# Patient Record
Sex: MALE | Race: WHITE | NOT HISPANIC OR LATINO | Employment: STUDENT | URBAN - METROPOLITAN AREA
[De-identification: names, ages, dates, MRNs, and addresses within clinical notes are randomized per-mention and may not be internally consistent; named-entity substitution may affect disease eponyms.]

---
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
## 2021-01-04 ENCOUNTER — HOSPITAL ENCOUNTER (OUTPATIENT)
Dept: OTHER | Facility: HOSPITAL | Age: 21
Discharge: HOME | End: 2021-01-04
Attending: PEDIATRICS

## 2021-01-05 LAB — SARS-COV-2 RNA SPEC QL NAA+PROBE: NOT DETECTED

## 2021-01-12 ENCOUNTER — HOSPITAL ENCOUNTER (OUTPATIENT)
Dept: OTHER | Facility: HOSPITAL | Age: 21
Discharge: HOME | End: 2021-01-12
Attending: INTERNAL MEDICINE

## 2021-01-12 LAB — SARS-COV-2 RNA SPEC QL NAA+PROBE: NOT DETECTED

## 2021-05-15 VITALS
WEIGHT: 239 LBS | TEMPERATURE: 97.4 F | HEIGHT: 70 IN | HEART RATE: 62 BPM | SYSTOLIC BLOOD PRESSURE: 118 MMHG | DIASTOLIC BLOOD PRESSURE: 68 MMHG | BODY MASS INDEX: 34.22 KG/M2

## 2021-05-15 VITALS
HEIGHT: 70 IN | DIASTOLIC BLOOD PRESSURE: 53 MMHG | SYSTOLIC BLOOD PRESSURE: 113 MMHG | WEIGHT: 241.12 LBS | HEART RATE: 68 BPM | OXYGEN SATURATION: 99 % | BODY MASS INDEX: 34.52 KG/M2

## 2021-05-15 VITALS — HEIGHT: 70 IN | RESPIRATION RATE: 12 BRPM | BODY MASS INDEX: 34.07 KG/M2 | WEIGHT: 238 LBS

## 2021-12-22 ENCOUNTER — IMMUNIZATION (OUTPATIENT)
Dept: VACCINE CLINIC | Facility: HOSPITAL | Age: 21
End: 2021-12-22

## 2021-12-22 PROCEDURE — 0003A: CPT | Performed by: INTERNAL MEDICINE

## 2021-12-22 PROCEDURE — 91300 HC SARSCOV02 VAC 30MCG/0.3ML IM: CPT | Performed by: INTERNAL MEDICINE

## 2021-12-22 PROCEDURE — 0004A HC ADM SARSCOV2 30MCG/0.3ML BOOSTER: CPT | Performed by: INTERNAL MEDICINE

## 2021-12-30 ENCOUNTER — LAB (OUTPATIENT)
Dept: LAB | Facility: HOSPITAL | Age: 21
End: 2021-12-30

## 2021-12-30 DIAGNOSIS — Z20.822 EXPOSURE TO COVID-19 VIRUS: Primary | ICD-10-CM

## 2021-12-30 DIAGNOSIS — Z20.822 EXPOSURE TO COVID-19 VIRUS: ICD-10-CM

## 2021-12-30 LAB — SARS-COV-2 RNA RESP QL NAA+PROBE: NOT DETECTED

## 2021-12-30 PROCEDURE — U0004 COV-19 TEST NON-CDC HGH THRU: HCPCS

## 2022-05-16 ENCOUNTER — PATIENT ROUNDING (BHMG ONLY) (OUTPATIENT)
Dept: INTERNAL MEDICINE | Facility: CLINIC | Age: 22
End: 2022-05-16

## 2022-05-16 ENCOUNTER — OFFICE VISIT (OUTPATIENT)
Dept: INTERNAL MEDICINE | Facility: CLINIC | Age: 22
End: 2022-05-16

## 2022-05-16 VITALS
TEMPERATURE: 97.7 F | HEART RATE: 63 BPM | DIASTOLIC BLOOD PRESSURE: 78 MMHG | WEIGHT: 249.4 LBS | BODY MASS INDEX: 35.71 KG/M2 | HEIGHT: 70 IN | SYSTOLIC BLOOD PRESSURE: 117 MMHG | OXYGEN SATURATION: 97 %

## 2022-05-16 DIAGNOSIS — G47.00 INSOMNIA, UNSPECIFIED TYPE: ICD-10-CM

## 2022-05-16 DIAGNOSIS — Z11.59 NEED FOR HEPATITIS C SCREENING TEST: ICD-10-CM

## 2022-05-16 DIAGNOSIS — Z00.00 ANNUAL PHYSICAL EXAM: Primary | ICD-10-CM

## 2022-05-16 DIAGNOSIS — J30.1 SEASONAL ALLERGIC RHINITIS DUE TO POLLEN: ICD-10-CM

## 2022-05-16 DIAGNOSIS — Z13.220 SCREENING FOR LIPID DISORDERS: ICD-10-CM

## 2022-05-16 DIAGNOSIS — M25.571 ACUTE RIGHT ANKLE PAIN: ICD-10-CM

## 2022-05-16 PROBLEM — J30.2 SEASONAL ALLERGIC RHINITIS: Status: ACTIVE | Noted: 2022-05-16

## 2022-05-16 LAB
ALBUMIN SERPL-MCNC: 4.7 G/DL (ref 3.5–5.2)
ALBUMIN/GLOB SERPL: 2.2 G/DL
ALP SERPL-CCNC: 57 U/L (ref 39–117)
ALT SERPL W P-5'-P-CCNC: 21 U/L (ref 1–41)
ANION GAP SERPL CALCULATED.3IONS-SCNC: 12.8 MMOL/L (ref 5–15)
AST SERPL-CCNC: 14 U/L (ref 1–40)
BASOPHILS # BLD AUTO: 0.05 10*3/MM3 (ref 0–0.2)
BASOPHILS NFR BLD AUTO: 0.7 % (ref 0–1.5)
BILIRUB SERPL-MCNC: 0.2 MG/DL (ref 0–1.2)
BUN SERPL-MCNC: 12 MG/DL (ref 6–20)
BUN/CREAT SERPL: 12.4 (ref 7–25)
CALCIUM SPEC-SCNC: 9.6 MG/DL (ref 8.6–10.5)
CHLORIDE SERPL-SCNC: 101 MMOL/L (ref 98–107)
CHOLEST SERPL-MCNC: 155 MG/DL (ref 0–200)
CO2 SERPL-SCNC: 23.2 MMOL/L (ref 22–29)
CREAT SERPL-MCNC: 0.97 MG/DL (ref 0.76–1.27)
DEPRECATED RDW RBC AUTO: 42 FL (ref 37–54)
EGFRCR SERPLBLD CKD-EPI 2021: 113.2 ML/MIN/1.73
EOSINOPHIL # BLD AUTO: 0.29 10*3/MM3 (ref 0–0.4)
EOSINOPHIL NFR BLD AUTO: 4.2 % (ref 0.3–6.2)
ERYTHROCYTE [DISTWIDTH] IN BLOOD BY AUTOMATED COUNT: 12.3 % (ref 12.3–15.4)
GLOBULIN UR ELPH-MCNC: 2.1 GM/DL
GLUCOSE SERPL-MCNC: 72 MG/DL (ref 65–99)
HCT VFR BLD AUTO: 45.4 % (ref 37.5–51)
HCV AB SER DONR QL: NORMAL
HDLC SERPL-MCNC: 38 MG/DL (ref 40–60)
HGB BLD-MCNC: 15 G/DL (ref 13–17.7)
LDLC SERPL CALC-MCNC: 95 MG/DL (ref 0–100)
LDLC/HDLC SERPL: 2.44 {RATIO}
LYMPHOCYTES # BLD AUTO: 1.81 10*3/MM3 (ref 0.7–3.1)
LYMPHOCYTES NFR BLD AUTO: 26.4 % (ref 19.6–45.3)
MCH RBC QN AUTO: 30.3 PG (ref 26.6–33)
MCHC RBC AUTO-ENTMCNC: 33 G/DL (ref 31.5–35.7)
MCV RBC AUTO: 91.7 FL (ref 79–97)
MONOCYTES # BLD AUTO: 0.5 10*3/MM3 (ref 0.1–0.9)
MONOCYTES NFR BLD AUTO: 7.3 % (ref 5–12)
NEUTROPHILS NFR BLD AUTO: 4.2 10*3/MM3 (ref 1.7–7)
NEUTROPHILS NFR BLD AUTO: 61.3 % (ref 42.7–76)
PLAT MORPH BLD: NORMAL
PLATELET # BLD AUTO: 291 10*3/MM3 (ref 140–450)
PMV BLD AUTO: 10.3 FL (ref 6–12)
POTASSIUM SERPL-SCNC: 4.4 MMOL/L (ref 3.5–5.2)
PROT SERPL-MCNC: 6.8 G/DL (ref 6–8.5)
RBC # BLD AUTO: 4.95 10*6/MM3 (ref 4.14–5.8)
RBC MORPH BLD: NORMAL
SODIUM SERPL-SCNC: 137 MMOL/L (ref 136–145)
TRIGL SERPL-MCNC: 121 MG/DL (ref 0–150)
VLDLC SERPL-MCNC: 22 MG/DL (ref 5–40)
WBC MORPH BLD: NORMAL
WBC NRBC COR # BLD: 6.86 10*3/MM3 (ref 3.4–10.8)

## 2022-05-16 PROCEDURE — 99395 PREV VISIT EST AGE 18-39: CPT | Performed by: INTERNAL MEDICINE

## 2022-05-16 PROCEDURE — 85025 COMPLETE CBC W/AUTO DIFF WBC: CPT | Performed by: INTERNAL MEDICINE

## 2022-05-16 PROCEDURE — 80053 COMPREHEN METABOLIC PANEL: CPT | Performed by: INTERNAL MEDICINE

## 2022-05-16 PROCEDURE — 99214 OFFICE O/P EST MOD 30 MIN: CPT | Performed by: INTERNAL MEDICINE

## 2022-05-16 PROCEDURE — 85007 BL SMEAR W/DIFF WBC COUNT: CPT | Performed by: INTERNAL MEDICINE

## 2022-05-16 PROCEDURE — 86803 HEPATITIS C AB TEST: CPT | Performed by: INTERNAL MEDICINE

## 2022-05-16 PROCEDURE — 80061 LIPID PANEL: CPT | Performed by: INTERNAL MEDICINE

## 2022-05-16 RX ORDER — DESLORATADINE 5 MG/1
5 TABLET ORAL DAILY
Qty: 90 TABLET | Refills: 3 | Status: SHIPPED | OUTPATIENT
Start: 2022-05-16

## 2022-05-16 RX ORDER — MONTELUKAST SODIUM 10 MG/1
10 TABLET ORAL NIGHTLY
Qty: 90 TABLET | Refills: 3 | Status: SHIPPED | OUTPATIENT
Start: 2022-05-16

## 2022-05-16 RX ORDER — DESLORATADINE 5 MG/1
5 TABLET ORAL DAILY
COMMUNITY
End: 2022-05-16 | Stop reason: SDUPTHER

## 2022-05-16 RX ORDER — ALBUTEROL SULFATE 90 UG/1
2 AEROSOL, METERED RESPIRATORY (INHALATION) EVERY 4 HOURS PRN
Qty: 18 G | Refills: 3 | Status: SHIPPED | OUTPATIENT
Start: 2022-05-16

## 2022-05-16 NOTE — PROGRESS NOTES
"Chief Complaint  Establish Care, Ankle Pain (Right- about a week. Landing on it wrong), and Insomnia (Not much sleeping- years)    Subjective          Humza Murdock presents to [facility]  History of Present Illness  Previous PCP: Dr mullins only saw him once  But patient thinks that was the last one   Specialist(s): n/a  COVID vaccine: UTD   Pneumonia vaccine: N/a  Shingles vaccine: N/a  Colon cancer screening: N/a     Right ankle pain - patient reports landing on ankle with inversion approx 1 week ago. Patient reports initially difficulty walking. Patient denies swelling and erythema. Patient reports improving pain throughout the last week.   Insomnia- patient reports difficulty falling asleep for several years. Patient reports restless sleep as well. Patient reports trial of melatonin previously without effect.     Current Outpatient Medications   Medication Instructions   • albuterol sulfate HFA (ProAir HFA) 108 (90 Base) MCG/ACT inhaler 2 puffs, Inhalation, Every 4 Hours PRN   • desloratadine (CLARINEX) 5 mg, Oral, Daily   • Fluticasone Furoate 50 mcg, Nasal, 2 Times Daily   • montelukast (SINGULAIR) 10 mg, Oral, Nightly       The following portions of the patient's history were reviewed and updated as appropriate: allergies, current medications, past family history, past medical history, past social history, past surgical history, and problem list.    Objective   Vital Signs:   /78 (BP Location: Left arm)   Pulse 63   Temp 97.7 °F (36.5 °C) (Temporal)   Ht 177.8 cm (70\")   Wt 113 kg (249 lb 6.4 oz)   SpO2 97%   BMI 35.79 kg/m²     Wt Readings from Last 3 Encounters:   05/16/22 113 kg (249 lb 6.4 oz)   12/17/19 109 kg (241 lb 2 oz) (99 %, Z= 2.24)*   12/19/18 108 kg (238 lb) (99 %, Z= 2.24)*     * Growth percentiles are based on CDC (Boys, 2-20 Years) data.     BP Readings from Last 3 Encounters:   05/16/22 117/78   12/17/19 113/53   12/18/18 118/68 "     Physical Exam   Appearance: No acute distress, well-nourished  Head: normocephalic, atraumatic  Eyes: extraocular movements intact, no scleral icterus, no conjunctival injection  Ears, Nose, and Throat: external ears normal, nares patent, moist mucous membranes  Cardiovascular: regular rate and rhythm. no murmurs, rubs, or gallops. no edema  Respiratory: breathing comfortably, symmetric chest rise, clear to auscultation bilaterally. No wheezes, rales, or rhonchi.  Neuro: alert and oriented to time, place, and person. Normal gait  Psych: normal mood and affect   Right ankle: no TTP overlying malleloi, midfoot, base of 5th metatarsal or achilles tendon. +ambulatory.     Result Review :   The following data was reviewed by: Albino Archibald Jr, MD on 05/16/2022:         Assessment and Plan    Diagnoses and all orders for this visit:    1. Annual physical exam (Primary)  -     Comprehensive Metabolic Panel  -     CBC & Differential    2. Seasonal allergic rhinitis due to pollen  Comments:  add isngulair to regimen for additioanl control.   Orders:  -     montelukast (Singulair) 10 MG tablet; Take 1 tablet by mouth Every Night.  Dispense: 90 tablet; Refill: 3  -     desloratadine (CLARINEX) 5 MG tablet; Take 1 tablet by mouth Daily.  Dispense: 90 tablet; Refill: 3  -     Fluticasone Furoate 50 MCG/ACT aerosol powder ; 50 mcg into the nostril(s) as directed by provider 2 (Two) Times a Day.  Dispense: 60 each; Refill: 3  -     albuterol sulfate HFA (ProAir HFA) 108 (90 Base) MCG/ACT inhaler; Inhale 2 puffs Every 4 (Four) Hours As Needed for Wheezing or Shortness of Air.  Dispense: 18 g; Refill: 3    3. Insomnia, unspecified type  Comments:  discussed sleep hygiene as well as possible medicine such as melatonin, trazodone, and doxepin.     4. Acute right ankle pain  Comments:  neg [place] ankle rules. likely sprain. monitor.     5. Screening for lipid disorders  -     Lipid Panel    6. Need for hepatitis C  screening test  -     Hepatitis C Antibody      Advised on diet, physical activity, etc      Medications Discontinued During This Encounter   Medication Reason   • desloratadine (CLARINEX) 5 MG tablet Reorder   • Albuterol Sulfate (PROAIR HFA IN) Reorder   • Fluticasone Furoate 50 MCG/ACT aerosol powder  Reorder        Follow Up {Instructions Charge Capture  Follow-up Communications :23}  Return in about 1 year (around 5/16/2023) for Annual physical.  Patient was given instructions and counseling regarding his condition or for health maintenance advice. Please see specific information pulled into the AVS if appropriate.       Albino Archibald Jr, MD  05/16/22  11:18 EDT

## 2022-05-16 NOTE — PROGRESS NOTES
A SoundCloud message has been sent to the patient for PATIENT ROUNDING with [facility]

## 2022-05-20 ENCOUNTER — TELEPHONE (OUTPATIENT)
Dept: INTERNAL MEDICINE | Facility: CLINIC | Age: 22
End: 2022-05-20

## 2022-05-20 RX ORDER — FLUTICASONE PROPIONATE 50 MCG
2 SPRAY, SUSPENSION (ML) NASAL DAILY
Qty: 16 G | Refills: 3 | Status: SHIPPED | OUTPATIENT
Start: 2022-05-20 | End: 2023-02-06

## 2022-08-17 ENCOUNTER — PATIENT ROUNDING (BHMG ONLY) (OUTPATIENT)
Dept: PODIATRY | Facility: CLINIC | Age: 22
End: 2022-08-17

## 2022-08-17 ENCOUNTER — OFFICE VISIT (OUTPATIENT)
Dept: PODIATRY | Facility: CLINIC | Age: 22
End: 2022-08-17

## 2022-08-17 VITALS
DIASTOLIC BLOOD PRESSURE: 63 MMHG | BODY MASS INDEX: 36.08 KG/M2 | OXYGEN SATURATION: 100 % | WEIGHT: 252 LBS | HEIGHT: 70 IN | HEART RATE: 64 BPM | TEMPERATURE: 97.3 F | SYSTOLIC BLOOD PRESSURE: 121 MMHG

## 2022-08-17 DIAGNOSIS — M79.671 FOOT PAIN, RIGHT: Primary | ICD-10-CM
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
## 2022-08-17 DIAGNOSIS — S93.411A SPRAIN OF CALCANEOFIBULAR LIGAMENT OF RIGHT ANKLE, INITIAL ENCOUNTER: ICD-10-CM

## 2022-08-17 PROCEDURE — 99203 OFFICE O/P NEW LOW 30 MIN: CPT | Performed by: PODIATRIST

## 2022-08-17 RX ORDER — CLOBETASOL PROPIONATE 0.46 MG/ML
SOLUTION TOPICAL
COMMUNITY
Start: 2022-08-10

## 2022-08-17 NOTE — PROGRESS NOTES
A Promobucket message has been sent to the patient for PATIENT ROUNDING with [facility]

## 2022-08-17 NOTE — PROGRESS NOTES
[facility]    Today's Date: 08/17/22    Patient Name: Humza Murdock  MRN: [id number]  CSN: [id number]  PCP: Albino Archibald Jr., MD  Referring Provider: No ref. provider found    SUBJECTIVE     Chief Complaint   Patient presents with   • Right Ankle - Establish Care, Pain     Landed wrong while climbing x 3 months      HPI: Humza Murdock, a 22 y.o.male, presents to clinic.    New, Established, New Problem: New    Location: Lateral right ankle    Duration: Late spring 2022    Onset: Acute.  Jumped approximately 2 to 3 feet landing while rockclimbing.  He states he rolled his right lateral ankle.  He states a couple days later he was able to hike and walk without difficulty    Nature: Intermittently sore    Stable, worsening, improving: Stable    Aggravating factors:  Patient relates pain is aggravated by shoe gear and ambulation.      Previous Treatment: None    Patient denies any fevers, chills, nausea, vomiting, shortness of breath, nor any other constitutional signs nor symptoms.    No other pedal complaints at this time.    Past Medical History:   Diagnosis Date   • Ankle pain, right    • Insomnia    • Seasonal allergies      Past Surgical History:   Procedure Laterality Date   • NO PAST SURGERIES       Family History   Problem Relation Age of Onset   • Diabetes Maternal Grandmother    • Cancer Maternal Grandfather    • Heart disease Maternal Grandfather    • Diabetes Maternal Grandfather    • Stroke Neg Hx      Social History     Socioeconomic History   • Marital status: Single   Tobacco Use   • Smoking status: Never Smoker   • Smokeless tobacco: Never Used   Vaping Use   • Vaping Use: Never used   Substance and Sexual Activity   • Alcohol use: Never   • Drug use: Never   • Sexual activity: Defer     No Known Allergies  Current Outpatient Medications   Medication Sig Dispense Refill   • albuterol sulfate HFA (ProAir HFA) 108 (90 Base) MCG/ACT inhaler Inhale 2 puffs Every  4 (Four) Hours As Needed for Wheezing or Shortness of Air. 18 g 3   • clobetasol (TEMOVATE) 0.05 % external solution APPLY TO AREA DAILY ON SCALP EVERY NIGHT AT BEDTIME FOR 1 MONTH     • desloratadine (CLARINEX) 5 MG tablet Take 1 tablet by mouth Daily. 90 tablet 3   • fluticasone (Flonase) 50 MCG/ACT nasal spray 2 sprays into the nostril(s) as directed by provider Daily. 16 g 3   • montelukast (Singulair) 10 MG tablet Take 1 tablet by mouth Every Night. 90 tablet 3     No current facility-administered medications for this visit.     Review of Systems   Constitutional: Negative.    Musculoskeletal:        Sore right lateral ankle   All other systems reviewed and are negative.      OBJECTIVE     Vitals:    08/17/22 0738   BP: 121/63   Pulse: 64   Temp: 97.3 °F (36.3 °C)   SpO2: 100%       WBC   Date Value Ref Range Status   05/16/2022 6.86 3.40 - 10.80 10*3/mm3 Final   06/04/2018 6.1 4.5 - 11.0 10*3/uL Final     RBC   Date Value Ref Range Status   05/16/2022 4.95 4.14 - 5.80 10*6/mm3 Final   06/04/2018 4.79 4.0 - 6.0 10*6/uL Final     Hemoglobin   Date Value Ref Range Status   05/16/2022 15.0 13.0 - 17.7 g/dL Final   06/04/2018 13.9 13.5 - 17.5 g/dL Final     Hematocrit   Date Value Ref Range Status   05/16/2022 45.4 37.5 - 51.0 % Final   06/04/2018 43.0 33.0 - 51.0 % Final     MCV   Date Value Ref Range Status   05/16/2022 91.7 79.0 - 97.0 fL Final   06/04/2018 89.8 80.0 - 100.0 fL Final     MCH   Date Value Ref Range Status   05/16/2022 30.3 26.6 - 33.0 pg Final   06/04/2018 29.0 27.0 - 31.0 pg Final     MCHC   Date Value Ref Range Status   05/16/2022 33.0 31.5 - 35.7 g/dL Final   06/04/2018 32.3 32.0 - 37.0 g/dL Final     RDW   Date Value Ref Range Status   05/16/2022 12.3 12.3 - 15.4 % Final   06/04/2018 12.5 11.6 - 13.7 % Final     RDW-SD   Date Value Ref Range Status   05/16/2022 42.0 37.0 - 54.0 fl Final     MPV   Date Value Ref Range Status   05/16/2022 10.3 6.0 - 12.0 fL Final   06/04/2018 7.6 (L) 7.8 - 11.0  fL Final     Platelets   Date Value Ref Range Status   05/16/2022 291 140 - 450 10*3/mm3 Final   06/04/2018 290 150 - 450 10*3/uL Final     Neutrophil %   Date Value Ref Range Status   05/16/2022 61.3 42.7 - 76.0 % Final     Lymphocyte Rel %   Date Value Ref Range Status   06/04/2018 32.1 19 - 50 % Final     Lymphocyte %   Date Value Ref Range Status   05/16/2022 26.4 19.6 - 45.3 % Final     Monocyte Rel %   Date Value Ref Range Status   06/04/2018 9.1 0 - 15 % Final     Monocyte %   Date Value Ref Range Status   05/16/2022 7.3 5.0 - 12.0 % Final     Eosinophil %   Date Value Ref Range Status   05/16/2022 4.2 0.3 - 6.2 % Final     Basophil %   Date Value Ref Range Status   05/16/2022 0.7 0.0 - 1.5 % Final     Immature Grans %   Date Value Ref Range Status   06/04/2018 58.8 42 - 75 % Final     Neutrophils, Absolute   Date Value Ref Range Status   05/16/2022 4.20 1.70 - 7.00 10*3/mm3 Final     Lymphocytes Absolute   Date Value Ref Range Status   06/04/2018 2.00 1.2 - 3.4 10*3/uL Final     Lymphocytes, Absolute   Date Value Ref Range Status   05/16/2022 1.81 0.70 - 3.10 10*3/mm3 Final     Monocytes Absolute   Date Value Ref Range Status   06/04/2018 0.60 0.1 - 0.6 10*3/uL Final     Monocytes, Absolute   Date Value Ref Range Status   05/16/2022 0.50 0.10 - 0.90 10*3/mm3 Final     Eosinophils, Absolute   Date Value Ref Range Status   05/16/2022 0.29 0.00 - 0.40 10*3/mm3 Final     Basophils, Absolute   Date Value Ref Range Status   05/16/2022 0.05 0.00 - 0.20 10*3/mm3 Final     Immature Grans, Absolute   Date Value Ref Range Status   06/04/2018 3.60 1.4 - 6.5 10*3/uL Final         Lab Results   Component Value Date    GLUCOSE 72 05/16/2022    BUN 12 05/16/2022    CREATININE 0.97 05/16/2022    BCR 12.4 05/16/2022    K 4.4 05/16/2022    CO2 23.2 05/16/2022    CALCIUM 9.6 05/16/2022    ALBUMIN 4.70 05/16/2022    AST 14 05/16/2022    ALT 21 05/16/2022       Patient seen in no apparent distress.      PHYSICAL EXAM:      Foot/Ankle Exam:       General:   Appearance: appears stated age and healthy    Orientation: AAOx3    Affect: appropriate    Gait: unimpaired    Shoe Gear:  Casual shoes    VASCULAR      Right Foot Vascularity   Normal vascular exam    Dorsalis pedis:  2+  Posterior tibial:  2+  Skin Temperature: warm    Edema Grading:  None  CFT:  < 3 seconds  Pedal Hair Growth:  Present  Varicosities: none       Left Foot Vascularity   Normal vascular exam    Dorsalis pedis:  2+  Posterior tibial:  2+  Skin Temperature: warm    Edema Grading:  None  CFT:  < 3 seconds  Pedal Hair Growth:  Present  Varicosities: none        NEUROLOGIC     Right Foot Neurologic   Normal sensation    Light touch sensation:  Normal  Vibratory sensation:  Normal  Hot/Cold sensation: normal    Protective Sensation using [place] Monofilament:  10     Left Foot Neurologic   Normal sensation    Light touch sensation:  Normal  Vibratory sensation:  Normal  Hot/cold sensation: normal    Protective Sensation using [place] Monofilament:  10     MUSCULOSKELETAL      Right Foot Musculoskeletal   Ecchymosis:  None  Tenderness comment:  Slight tenderness over the calcaneal fibular ligament.     MUSCLE STRENGTH     Right Foot Muscle Strength   Foot dorsiflexion:  4  Foot plantar flexion:  4  Foot inversion:  4  Foot eversion:  4     Left Foot Muscle Strength   Foot dorsiflexion:  4  Foot plantar flexion:  4  Foot inversion:  4  Foot eversion:  4     RANGE OF MOTION      Right Foot Range of Motion   Foot and ankle ROM within normal limits       Left Foot Range of Motion   Foot and ankle ROM within normal limits       DERMATOLOGIC     Right Foot Dermatologic   Skin: skin intact    Nails: normal       Left Foot Dermatologic   Skin: skin intact    Nails: normal       TESTS     Right Foot Tests   Anterior drawer: negative       Left Foot Tests   Anterior drawer: negative        RADIOLOGY:      XR Ankle 3+ View Right    Result Date:  8/17/2022  Narrative: IN-OFFICE IMAGING:  Standing, weightbearing, 3 view, AP, MO, Lateral, right ankle Indication: Ankle pain Findings: Congruent ankle mortise.  No periosteal reactions nor osteolytic changes seen.  No occult fractures seen. Comparison: No comparison views available.       ASSESSMENT/PLAN     Diagnoses and all orders for this visit:    1. Foot pain, right (Primary)    2. Sprain of calcaneofibular ligament of right ankle, initial encounter    3. Injury while rock climbing        Comprehensive lower extremity examination and evaluation was performed.    Discussed findings and treatment plan including risks, benefits, and treatment options with patient in detail. Patient agreed with treatment plan.    Medications and allergies reviewed.  Reviewed available lab values along with other pertinent labs.  These were discussed with the patient.    Rice Therapy: It is important to treat any injury as soon as possible to help control swelling and increase recovery time. The recognized regimen for immediate treatment of sport injuries includes rest, ice (cold application), compression, and elevation (RICE). Remove the injured athlete from play, apply ice to the affected area, wrap or compress the injured area with an elastic bandage when appropriate, and elevate the injured area above heart level to reduce swelling.  The patient is to not use ice for longer than 20 minutes at a time, with at least 20 minutes of no ice usage between applications.  The patient states understanding and agreement with this plan.    Patient to use OTC ankle brace as needed.    Patient is to monitor for recurrence and any new symptoms and to contact Dr. Rodriguez's office for a follow-up appointment.      The patient states understanding and agreement with this plan.    An After Visit Summary was printed and given to the patient at discharge, including (if requested) any available informative/educational handouts regarding diagnosis,  treatment, or medications. All questions were answered to patient/family satisfaction. Should symptoms fail to improve or worsen they agree to call or return to clinic or to go to the Emergency Department. Discussed the importance of following up with any needed screening tests/labs/specialist appointments and any requested follow-up recommended by me today. Importance of maintaining follow-up discussed and patient accepts that missed appointments can delay diagnosis and potentially lead to worsening of conditions.    Return if symptoms worsen or fail to improve., or sooner if acute issues arise.    This document has been electronically signed by Frederic Rodriguez DPM on August 17, 2022 10:52 EDT

## 2023-02-06 RX ORDER — FLUTICASONE PROPIONATE 50 MCG
SPRAY, SUSPENSION (ML) NASAL
Qty: 16 G | Refills: 3 | Status: SHIPPED | OUTPATIENT
Start: 2023-02-06

## 2024-11-27 ENCOUNTER — APPOINTMENT (OUTPATIENT)
Dept: URBAN - METROPOLITAN AREA CLINIC 216 | Age: 24
Setting detail: DERMATOLOGY
End: 2024-12-01

## 2024-11-27 DIAGNOSIS — L40.0 PSORIASIS VULGARIS: ICD-10-CM

## 2024-11-27 PROCEDURE — OTHER COUNSELING: OTHER

## 2024-11-27 PROCEDURE — 99203 OFFICE O/P NEW LOW 30 MIN: CPT

## 2024-11-27 PROCEDURE — OTHER PRESCRIPTION: OTHER

## 2024-11-27 RX ORDER — CLOBETASOL PROPIONATE 0.5 MG/G
AEROSOL, FOAM TOPICAL
Qty: 50 | Refills: 5 | Status: ERX | COMMUNITY
Start: 2024-11-27

## 2024-11-27 ASSESSMENT — LOCATION DETAILED DESCRIPTION DERM: LOCATION DETAILED: LEFT MEDIAL FRONTAL SCALP

## 2024-11-27 ASSESSMENT — BSA PSORIASIS: % BODY COVERED IN PSORIASIS: 3

## 2024-11-27 ASSESSMENT — ITCH NUMERIC RATING SCALE: HOW SEVERE IS YOUR ITCHING?: 6

## 2024-11-27 ASSESSMENT — LOCATION ZONE DERM: LOCATION ZONE: SCALP

## 2024-11-27 ASSESSMENT — LOCATION SIMPLE DESCRIPTION DERM: LOCATION SIMPLE: LEFT SCALP

## 2024-11-27 NOTE — HPI: RASH
Is This A New Presentation, Or A Follow-Up?: Rash
Additional History: Pt has tried otc psoriasis shampoos and tried Otezla in the past. Pt admits that he was forgetful when on the Rx and did not take it daily and did not see much improvement.
37.2